# Patient Record
Sex: MALE | Race: WHITE | NOT HISPANIC OR LATINO | Employment: OTHER | URBAN - METROPOLITAN AREA
[De-identification: names, ages, dates, MRNs, and addresses within clinical notes are randomized per-mention and may not be internally consistent; named-entity substitution may affect disease eponyms.]

---

## 2018-04-03 NOTE — PATIENT DISCUSSION
Cataracts are approaching visual significance. Cleared for monofocal only by retina. CME precautions.

## 2018-06-26 NOTE — PATIENT DISCUSSION
1 day PO: Patient is doing well post-operatively. The importance of post-op drop compliance was emphasized. Drop schedule reviewed with patient. Patient to call if any visual changes or concerns.  CME protocol.

## 2018-07-24 NOTE — PATIENT DISCUSSION
Increased edema seen on today's examiantion and diagnostic testing. It may be PO related. If edema persists, we will treat. For now we will observe and follow up in two months.

## 2018-09-25 NOTE — PATIENT DISCUSSION
Lucentis injection recommended today after discussion of benefits, risks, alternatives. The injection was administered without complication. Post-injection instructions were reviewed and understood by the patient.

## 2018-09-25 NOTE — PROCEDURE NOTE: CLINICAL
PROCEDURE NOTE: Lucentis 0.3mg OD. Diagnosis: Diabetic Macular Edema. The patient was identified visually and verbally by the  surgeon. The procedure eye was marked with an adhesive arrow sticker. The  risks, benefits, alternatives and possible complications of the procedure  were discussed with the patient and informed consent was obtained. The  patient was positioned in the chair. After numerous topical anesthetic  drops were placed, subconjunctival lidocaine was administered. A speculum  was used to hold the eyelid open. A caliper was used to marked the site of  injection 3.5-4mm from the limbus. Betadine was placed on the site with a  swab and allowed to sit for thirty seconds. A sterile 30 or 31 guage needle with the  medication entered the vitreous cavity and the medication was  administered. The speculum was removed. The eye was copiously rinsed with  saline to remove all betadine, especially from the fornices. Gross vision  was assessed. If the patient wished an antibiotic ointment and eye patch  were applied. The patient was instructed to call immediately if any  significant visual loss, swelling, discharge, or pain occurred Intravitreal injection of Lucentis 0.3mg/0.05 ml was given. Patient tolerated the procedure well. There were no complications. CF vision checked. Post procedure instructions given. Isis Rojas

## 2018-09-25 NOTE — PATIENT DISCUSSION
Based on today’s exam, diagnostic studies, and/or review of records, the determination was made for treatment today. We will do a series of 3 injections 1 month apart. Today will be 1/3.

## 2018-10-23 NOTE — PATIENT DISCUSSION
Based on today's exam, diagnostic studies, and/or review of records, the determination was made for treatment today.  Decreased edema, improved.  Recommend  #2 of a series of 3 today, follow up in 1 month for 3/3.

## 2018-10-23 NOTE — PROCEDURE NOTE: CLINICAL
PROCEDURE NOTE: Lucentis 0.3mg OD. Diagnosis: Branch Retinal Vein Occlusion with Macular Edema. Anesthesia: Topical. Prep: Betadine Drops and Betadine Scrub. The patient was identified visually and verbally by the  surgeon. The procedure eye was marked with an adhesive arrow sticker. The  risks, benefits, alternatives and possible complications of the procedure  were discussed with the patient and informed consent was obtained. The  patient was positioned in the chair. After numerous topical anesthetic  drops were placed, subconjunctival lidocaine was administered. A speculum  was used to hold the eyelid open. A caliper was used to marked the site of  injection 3.5-4mm from the limbus. Betadine was placed on the site with a  swab and allowed to sit for thirty seconds. A sterile 30 or 31 guage needle with the  medication entered the vitreous cavity and the medication was  administered. The speculum was removed. The eye was copiously rinsed with  saline to remove all betadine, especially from the fornices. Gross vision  was assessed. If the patient wished an antibiotic ointment and eye patch  were applied. The patient was instructed to call immediately if any  significant visual loss, swelling, discharge, or pain occurred Intravitreal injection of Lucentis 0.3mg/0.05 ml was given. Patient tolerated the procedure well. There were no complications. CF vision checked. Post procedure instructions given. Kaity Powell

## 2018-11-20 NOTE — PATIENT DISCUSSION
Tight BP control discussed. Discussed the importance of blood pressure control in the prevention of ocular complications.

## 2018-11-20 NOTE — PATIENT DISCUSSION
Tight BS control discussed with patient. Discussed the importance of blood sugar control in the prevention of ocular complications.

## 2018-11-20 NOTE — PATIENT DISCUSSION
Tight BP/BS control. Discussed with the patient the importance of good control of their blood sugar, blood pressure, cholesterol, diet, exercise, weight, and medication usage under the guidance of their diabetic doctor to prevent/halt diabetic retinopathy.

## 2018-11-20 NOTE — PROCEDURE NOTE: CLINICAL
PROCEDURE NOTE: Lucentis 0.3mg OD. Diagnosis: Diabetic Macular Edema. The patient was identified visually and verbally by the  surgeon. The procedure eye was marked with an adhesive arrow sticker. The  risks, benefits, alternatives and possible complications of the procedure  were discussed with the patient and informed consent was obtained. The  patient was positioned in the chair. After numerous topical anesthetic  drops were placed, subconjunctival lidocaine was administered. A speculum  was used to hold the eyelid open. A caliper was used to marked the site of  injection 3.5-4mm from the limbus. Betadine was placed on the site with a  swab and allowed to sit for thirty seconds. A sterile 30 or 31 guage needle with the  medication entered the vitreous cavity and the medication was  administered. The speculum was removed. The eye was copiously rinsed with  saline to remove all betadine, especially from the fornices. Gross vision  was assessed. If the patient wished an antibiotic ointment and eye patch  were applied. The patient was instructed to call immediately if any  significant visual loss, swelling, discharge, or pain occurred Intravitreal injection of Lucentis 0.3mg/0.05 ml was given. Patient tolerated the procedure well. There were no complications. CF vision checked. Post procedure instructions given. Damon Young

## 2018-11-20 NOTE — PATIENT DISCUSSION
Based on today's exam, diagnostic studies, and/or review of records, the determination was made for treatment today.  Decreased edema, improved.  Recommend  #3 of a series of 3 today, follow up in 2 weeks for laser treatment, return in 7-8 weeks for possible IVL.

## 2018-11-30 NOTE — PROCEDURE NOTE: CLINICAL
PROCEDURE NOTE: Focal Laser, Retina OD. Diagnosis: Branch Retinal Vein Occlusion with Macular Edema. Anesthesia: Topical. Prior to focal laser, risks/benefits/alternatives to laser discussed including loss of vision and patient wished to proceed. Spot size: 100 um. Power: 100 mW. Number of pulses: 7. Patient tolerated procedure well. There were no complications. Post procedure instructions given. Huma James

## 2018-11-30 NOTE — PATIENT DISCUSSION
Based on today’s exam, diagnostic studies, and review of records, the determination was made for LASER treatment.

## 2019-01-08 NOTE — PATIENT DISCUSSION
Based on today’s exam, diagnostic studies, and review of records, the determination was made for no treatment.  Minimal edema present today, improved.  Will monitor for now.  Follow up in 7-8 weeks.

## 2020-01-15 ENCOUNTER — NEW PATIENT COMPREHENSIVE (OUTPATIENT)
Dept: URBAN - METROPOLITAN AREA CLINIC 43 | Facility: CLINIC | Age: 65
End: 2020-01-15

## 2020-01-15 DIAGNOSIS — H17.9: ICD-10-CM

## 2020-01-15 DIAGNOSIS — Z98.890: ICD-10-CM

## 2020-01-15 DIAGNOSIS — H25.9: ICD-10-CM

## 2020-01-15 PROCEDURE — 92004 COMPRE OPH EXAM NEW PT 1/>: CPT

## 2020-01-15 PROCEDURE — 92015 DETERMINE REFRACTIVE STATE: CPT

## 2020-01-15 ASSESSMENT — VISUAL ACUITY
OS_CC: J4-
OS_SC: 20/40-2
OD_SC: J10-
OS_SC: J12
OD_SC: 20/30-1
OD_CC: J1

## 2020-01-15 ASSESSMENT — TONOMETRY
OS_IOP_MMHG: 14
OD_IOP_MMHG: 14

## 2021-01-19 ENCOUNTER — ESTABLISHED COMPREHENSIVE EXAM (OUTPATIENT)
Dept: URBAN - METROPOLITAN AREA CLINIC 43 | Facility: CLINIC | Age: 66
End: 2021-01-19

## 2021-01-19 DIAGNOSIS — H04.123: ICD-10-CM

## 2021-01-19 DIAGNOSIS — Z98.890: ICD-10-CM

## 2021-01-19 DIAGNOSIS — H25.9: ICD-10-CM

## 2021-01-19 DIAGNOSIS — H17.9: ICD-10-CM

## 2021-01-19 PROCEDURE — 92015 DETERMINE REFRACTIVE STATE: CPT

## 2021-01-19 PROCEDURE — 92014 COMPRE OPH EXAM EST PT 1/>: CPT

## 2021-01-19 ASSESSMENT — VISUAL ACUITY
OD_SC: 20/30-2
OS_BAT: 20/60
OD_SC: J2-
OS_SC: 20/40-1
OD_BAT: 20/60-2
OS_SC: J10

## 2021-01-19 ASSESSMENT — TONOMETRY
OS_IOP_MMHG: 14
OD_IOP_MMHG: 12

## 2022-03-01 ENCOUNTER — COMPREHENSIVE EXAM (OUTPATIENT)
Dept: URBAN - METROPOLITAN AREA CLINIC 43 | Facility: CLINIC | Age: 67
End: 2022-03-01

## 2022-03-01 DIAGNOSIS — H25.9: ICD-10-CM

## 2022-03-01 DIAGNOSIS — H04.123: ICD-10-CM

## 2022-03-01 DIAGNOSIS — H17.9: ICD-10-CM

## 2022-03-01 PROCEDURE — 92014 COMPRE OPH EXAM EST PT 1/>: CPT

## 2022-03-01 PROCEDURE — 92015 DETERMINE REFRACTIVE STATE: CPT

## 2022-03-01 ASSESSMENT — TONOMETRY
OD_IOP_MMHG: 9
OS_IOP_MMHG: 9

## 2022-03-01 ASSESSMENT — VISUAL ACUITY
OD_CC: J1
OS_CC: J1
OS_SC: 20/40-2
OD_SC: J3
OS_SC: J12
OD_SC: 20/30+2

## 2024-03-04 ENCOUNTER — COMPREHENSIVE EXAM (OUTPATIENT)
Dept: URBAN - METROPOLITAN AREA CLINIC 43 | Facility: CLINIC | Age: 69
End: 2024-03-04

## 2024-03-04 DIAGNOSIS — H17.9: ICD-10-CM

## 2024-03-04 DIAGNOSIS — H04.123: ICD-10-CM

## 2024-03-04 DIAGNOSIS — H25.9: ICD-10-CM

## 2024-03-04 PROCEDURE — 92015 DETERMINE REFRACTIVE STATE: CPT

## 2024-03-04 PROCEDURE — 92014 COMPRE OPH EXAM EST PT 1/>: CPT

## 2024-03-04 ASSESSMENT — VISUAL ACUITY
OS_SC: 20/20-1
OD_SC: 20/25+2
OS_SC: J12
OD_SC: J6

## 2024-03-04 ASSESSMENT — TONOMETRY
OD_IOP_MMHG: 8
OS_IOP_MMHG: 10

## 2024-04-03 NOTE — PATIENT DISCUSSION
07/24/2018 (RETINA)- OCT/FA/FP/IRIS at the next visit. Recommended following up in 2 months for a dilated recheck. Sotyktu Pregnancy And Lactation Text: There is insufficient data to evaluate whether or not Sotyktu is safe to use during pregnancy.? ?It is not known if Sotyktu passes into breast milk and whether or not it is safe to use when breastfeeding.??

## 2024-12-30 ENCOUNTER — EMERGENCY VISIT (OUTPATIENT)
Age: 69
End: 2024-12-30

## 2024-12-30 DIAGNOSIS — H05.20: ICD-10-CM

## 2024-12-30 DIAGNOSIS — H25.9: ICD-10-CM

## 2024-12-30 DIAGNOSIS — H04.123: ICD-10-CM

## 2024-12-30 DIAGNOSIS — H17.9: ICD-10-CM

## 2024-12-30 DIAGNOSIS — H53.2: ICD-10-CM

## 2024-12-30 PROCEDURE — 99213 OFFICE O/P EST LOW 20 MIN: CPT

## 2024-12-30 PROCEDURE — 92133 CPTRZD OPH DX IMG PST SGM ON: CPT

## 2024-12-30 PROCEDURE — 92060 SENSORIMOTOR EXAMINATION: CPT
